# Patient Record
Sex: MALE | Race: OTHER
[De-identification: names, ages, dates, MRNs, and addresses within clinical notes are randomized per-mention and may not be internally consistent; named-entity substitution may affect disease eponyms.]

---

## 2017-03-02 ENCOUNTER — HOSPITAL ENCOUNTER (OUTPATIENT)
Dept: HOSPITAL 61 - ECHO | Age: 72
Discharge: HOME | End: 2017-03-02
Attending: INTERNAL MEDICINE
Payer: MEDICARE

## 2017-03-02 DIAGNOSIS — J44.9: ICD-10-CM

## 2017-03-02 DIAGNOSIS — I50.9: Primary | ICD-10-CM

## 2017-03-02 DIAGNOSIS — I07.1: ICD-10-CM

## 2017-03-02 PROCEDURE — 93306 TTE W/DOPPLER COMPLETE: CPT

## 2017-03-02 NOTE — CARD
--------------- APPROVED REPORT --------------





EXAM: Two-dimensional and M-mode echocardiogram with Doppler and color Doppler.



Other Information 

Quality : GoodHR: 92bpm

Rhythm : NSR



INDICATION

Congestive Heart Failure 

Heart failure (unspecified), COPD



RISK FACTORS

Hypertension 

Obesity   



2D DIMENSIONS 

RVDd3.4 (2.9-3.5cm)Left Atrium(2D)4.4 (1.6-4.0cm)

IVSd1.2 (0.7-1.1cm)Aortic Root(2D)2.8 (2.0-3.7cm)

LVDd5.3 (3.9-5.9cm)LVOT Diameter2.5 (1.8-2.4cm)

PWd1.2 (0.7-1.1cm)LA Qwbpic32 (18-58mL)

LVDs3.7 (2.5-4.0cm)FS (%) 31.0 %

SV78.9 mlLVEF(%)58.3 (>50%)



Aortic Valve

AoV Peak El.167.9cm/sAoV VTI35.0cm

AO Peak GR.11.3mmHgLVOT Peak El.114.2cm/s

LVOT  VTI 24.34cmAO Mean GR.7mmHg

JOSE (VMAX)2.67vu9XEG   (VTI)3.31cm2



Mitral Valve

MV E Cgidervt70.1cm/sMV DECEL [id number]

MV A Xzmkgnpz298.0cm/sMV E Mean Gr.2mmHg

MV ZJO09xtQ/A  Ratio0.8

MV A Rvtmvgek898krLSO (PHT)4.09cm2



TDI

E/Lateral E'12.4E/Medial E'13.1



Pulmonary Valve

PV Peak Elxzygau200.6cm/sPV Peak Grad.5mmHg



Tricuspid Valve

TR P. Icgwqroj296dq/sRAP QAHBSHJF7xqQi

TR Peak Gr.00bgJeAXJU38wdHr



Pulmonary Vein

S1 Wxosoqbl63.3cm/sD2 Mukpyiou21.8cm/s

PVa mikqrvfu428wjjf



 LEFT VENTRICLE 

The left ventricle is normal size. There is mild concentric left ventricular hypertrophy. The left ve
ntricular systolic function is normal. The Ejection Fraction is 60-65%. There is normal LV segmental 
wall motion. Transmitral Doppler flow pattern is Grade I-abnormal relaxation pattern.



 RIGHT VENTRICLE 

The right ventricle is normal size. There is normal right ventricular wall thickness. The right ventr
icular systolic function is normal.



 ATRIA 

The left atrium is mildly dilated. The right atrium size is normal. The interatrial septum is intact 
with no evidence for an atrial septal defect or patent foramen ovale as noted on 2-D or Doppler imagi
ng.



 AORTIC VALVE 

The aortic valve is mildly sclerotic. The aortic valve is trileaflet. Doppler and Color Flow revealed
 no significant aortic regurgitation. There is no significant aortic valvular stenosis.



 MITRAL VALVE 

The mitral valve leaflets are thickened. There is no evidence of mitral valve prolapse. There is no m
itral valve stenosis. Doppler and Color Flow revealed no mitral valve regurgitation noted.



 TRICUSPID VALVE 

Doppler and Color Flow revealed trace tricuspid regurgitation. The pulmonary artery systolic pressure
 is estimated at 40 mmHg. There is mild pulmonary hypertension.



 PULMONIC VALVE 

Doppler and Color Flow revealed trace pulmonic valvular regurgitation. There is no pulmonic valvular 
stenosis.



 GREAT VESSELS 

The aortic root is normal in size. The ascending aorta is normal in size. The pulmonary artery is nor
mal. The IVC is normal in size and collapses >50% with inspiration.



 PERICARDIAL EFFUSION 

There is no evidence of significant pericardial effusion.



Critical Notification

Critical Value: No



<Conclusion>

The left ventricular systolic function is normal.

The Ejection Fraction is 60-65%.

There is normal LV segmental wall motion.

Transmitral Doppler flow pattern is Grade I-abnormal relaxation pattern.

Doppler and Color Flow revealed trace tricuspid regurgitation. 

The pulmonary artery systolic pressure is estimated at 40 mmHg. 

There is no evidence of significant pericardial effusion.

## 2018-02-27 ENCOUNTER — HOSPITAL ENCOUNTER (OUTPATIENT)
Dept: HOSPITAL 61 - ECHO | Age: 73
Discharge: HOME | End: 2018-02-27
Attending: NURSE PRACTITIONER
Payer: MEDICARE

## 2018-02-27 DIAGNOSIS — I31.3: ICD-10-CM

## 2018-02-27 DIAGNOSIS — I11.0: Primary | ICD-10-CM

## 2018-02-27 DIAGNOSIS — I36.1: ICD-10-CM

## 2018-02-27 DIAGNOSIS — E66.9: ICD-10-CM

## 2018-02-27 DIAGNOSIS — I50.9: ICD-10-CM

## 2018-02-27 PROCEDURE — 93306 TTE W/DOPPLER COMPLETE: CPT

## 2018-03-01 ENCOUNTER — HOSPITAL ENCOUNTER (OUTPATIENT)
Dept: HOSPITAL 61 - US | Age: 73
Discharge: HOME | End: 2018-03-01
Attending: NURSE PRACTITIONER
Payer: MEDICARE

## 2018-03-01 DIAGNOSIS — E66.01: Primary | ICD-10-CM

## 2018-03-01 DIAGNOSIS — K76.0: ICD-10-CM

## 2018-03-01 PROCEDURE — 76705 ECHO EXAM OF ABDOMEN: CPT

## 2018-09-24 ENCOUNTER — HOSPITAL ENCOUNTER (OUTPATIENT)
Dept: HOSPITAL 61 - ECHO | Age: 73
Discharge: HOME | End: 2018-09-24
Attending: INTERNAL MEDICINE
Payer: MEDICARE

## 2018-09-24 DIAGNOSIS — I50.9: ICD-10-CM

## 2018-09-24 DIAGNOSIS — J44.9: ICD-10-CM

## 2018-09-24 DIAGNOSIS — I11.0: ICD-10-CM

## 2018-09-24 DIAGNOSIS — I36.1: Primary | ICD-10-CM

## 2018-09-24 DIAGNOSIS — E66.01: ICD-10-CM

## 2018-09-24 PROCEDURE — 93306 TTE W/DOPPLER COMPLETE: CPT

## 2018-09-24 NOTE — CARD
MR#: [id number]

Account#: [id number]

Accession#: [id number]

Date of Study: 09/24/2018

Ordering Physician: DANTE BENNETT, 

Referring Physician: DANTE BENNETT, 

Tech: Jaki Faye RDCS





--------------- APPROVED REPORT --------------





EXAM: Two-dimensional and M-mode echocardiogram with Doppler and color Doppler.



Other Information 

Quality : Good



INDICATION

Exertional Dyspnea



2D DIMENSIONS 

RVDd3.2 (2.9-3.5cm)Left Atrium(2D)4.2 (1.6-4.0cm)

IVSd1.0 (0.7-1.1cm)Aortic Root(2D)2.9 (2.0-3.7cm)

LVDd4.7 (3.9-5.9cm)LVOT Diameter2.2 (1.8-2.4cm)

PWd1.0 (0.7-1.1cm)LVDs3.6 (2.5-4.0cm)

FS (%) 23.3 %SV48.2 ml

LVEF(%)60.0 (>50%)



Aortic Valve

AoV Peak El.155.2cm/sAoV VTI31.8cm

AO Peak GR.9.6mmHgLVOT Peak El.121.5cm/s

LVOT  VTI 29.16cmAO Mean GR.5mmHg

JOSE (VMAX)3.44ie4SBH   (VTI)3.51cm2



Mitral Valve

MV E Hlxmtmlc78.3cm/sMV DECEL PQJJ267cs

MV A Kcitrsdo640.4cm/sMV ALG36ey

E/A  Ratio0.6MVA (PHT)2.98cm2



TDI

E/Lateral E'10.7E/Medial E'14.5



Tricuspid Valve

TR P. Bcukdlus021og/sRAP VGEKAILU34weZs

TR Peak Gr.82trKxBZGI23xvRy



Pulmonary Vein

S1 Xqvpkpyt82.1cm/sD2 Hxihbjcg47.0cm/s



 LEFT VENTRICLE 

The left ventricle is normal size. There is normal left ventricular wall thickness. The left ventricu
lar systolic function is normal and the ejection fraction is within normal range. The Ejection Fracti
on is 60-65%. There is normal LV segmental wall motion. Transmitral Doppler flow pattern is Grade I-a
bnormal relaxation pattern.



 RIGHT VENTRICLE 

The right ventricle is mildly dilated. The right ventricular systolic function is normal.



 ATRIA 

The left atrium is mildly dilated. The right atrium is mildly dilated. The interatrial septum is inta
ct with no evidence for an atrial septal defect or patent foramen ovale as noted on 2-D or Doppler im
aging.



 AORTIC VALVE 

The aortic valve is calcified but opens well. Doppler and Color Flow revealed no significant aortic r
egurgitation. There is no significant aortic valvular stenosis.



 MITRAL VALVE 

The mitral valve is calcified but opens well. There is no evidence of mitral valve prolapse. There is
 no mitral valve stenosis. Doppler and Color Flow revealed trace mitral valve regurgitation.



 TRICUSPID VALVE 

The tricuspid valve is normal in structure and function. Doppler and Color Flow revealed mild tricusp
id regurgitation. The PA pressure was estimated at 54 mmHg. There is no tricuspid valve stenosis.



 PULMONIC VALVE 

The pulmonic valve is not well visualized. Doppler and Color Flow revealed mild pulmonic valvular reg
urgitation. There is no pulmonic valvular stenosis.



 GREAT VESSELS 

The aortic root is normal in size. The ascending aorta is mildly dilated. The IVC is dilated and shanti
apses <50% with inspiration.



 PERICARDIAL EFFUSION 

There is no evidence of significant pericardial effusion.



Critical Notification

Critical Value: No



<Conclusion>

The left ventricle is normal size.

The left ventricular systolic function is normal and the ejection fraction is within normal range.

The Ejection Fraction is 60-65%.

There is no significant aortic valvular stenosis.

Doppler and Color Flow revealed no significant aortic regurgitation.

Doppler and Color Flow revealed trace mitral valve regurgitation.

Doppler and Color Flow revealed mild tricuspid regurgitation.

The PA pressure was estimated at 54 mmHg.

The ascending aorta is mildly dilated.



Signed by : Dante Bennett MD

Electronically Approved : 09/24/2018 12:03:43

## 2018-10-08 ENCOUNTER — HOSPITAL ENCOUNTER (OUTPATIENT)
Dept: HOSPITAL 61 - LAB | Age: 73
Discharge: HOME | End: 2018-10-08
Attending: INTERNAL MEDICINE
Payer: MEDICARE

## 2018-10-08 DIAGNOSIS — R06.00: Primary | ICD-10-CM

## 2018-10-08 PROCEDURE — 83880 ASSAY OF NATRIURETIC PEPTIDE: CPT

## 2018-10-08 PROCEDURE — 36415 COLL VENOUS BLD VENIPUNCTURE: CPT

## 2018-10-08 PROCEDURE — 71046 X-RAY EXAM CHEST 2 VIEWS: CPT

## 2018-10-08 NOTE — RAD
Chest, 2 views, 10/8/2018:

 

HISTORY: Shortness of breath

 

Comparison is made to a study from 1/4/2017. The heart size is normal. 

There is calcific plaquing of the aorta. Bibasilar streaky opacities 

appear to have worsened slightly since the previous study. The appearance 

suggests a combination of atelectasis and scarring. The upper lung fields 

are clear. No pleural fluid is evident. Scattered degenerative changes are

present in the spine. Incidental note is made of a lap band type device 

related to the proximal aspect of the stomach.

 

IMPRESSION: Moderate bibasilar linear atelectasis and/or scarring.

 

Electronically signed by: Rick Moritz, MD (10/8/2018 3:51 PM) [facility]

## 2018-11-21 ENCOUNTER — HOSPITAL ENCOUNTER (OUTPATIENT)
Dept: HOSPITAL 61 - CT | Age: 73
Discharge: HOME | End: 2018-11-21
Attending: INTERNAL MEDICINE
Payer: MEDICARE

## 2018-11-21 DIAGNOSIS — I25.10: Primary | ICD-10-CM

## 2018-11-21 PROCEDURE — 71250 CT THORAX DX C-: CPT

## 2018-11-21 NOTE — RAD
CT of the chest without contrast, 11/21/2018:

 

HISTORY: Abnormal chest x-ray, short of breath

 

Multidetector CT imaging was performed without contrast as requested.

 

There is mild calcific plaquing of the thoracic aorta without evidence of 

aneurysm. Moderate scattered coronary artery calcifications are present. 

The heart is at the upper limits of normal in size. There are calcified 

mediastinal and right hilar lymph nodes compatible with old granulomatous 

disease. No mediastinal adenopathy is seen. A LAP-BAND type device is 

evident near the GE junction level.

 

There are moderate streaky parenchymal opacities primarily in the lung 

bases. The appearance suggests atelectasis and/or scarring. No pulmonary 

mass is seen. There are prominent subpleural fat accumulations in the 

lower chest as well as abundant mediastinal fat. No pleural fluid is 

evident.

 

There is mild bilateral renal scarring with mild streaky bilateral 

perinephric scarring or edema.

 

Moderate multilevel degenerative changes are present in the spine.

 

 

IMPRESSION:

1. Moderate bibasilar linear atelectasis and/or scarring.

2. Moderate coronary artery calcifications.

 

 

 

PQRS Compliance Statement:

 

One or more of the following individualized dose reduction techniques were

utilized for this examination:  

1. Automated exposure control  

2. Adjustment of the mA and/or kV according to patient size  

3. Use of iterative reconstruction technique

 

 

Electronically signed by: Rick Moritz, MD (11/21/2018 4:42 PM) [facility]

## 2018-12-20 ENCOUNTER — HOSPITAL ENCOUNTER (OUTPATIENT)
Dept: HOSPITAL 61 - SLPLAB | Age: 73
Discharge: HOME | End: 2018-12-20
Attending: INTERNAL MEDICINE
Payer: MEDICARE

## 2018-12-20 DIAGNOSIS — G47.33: Primary | ICD-10-CM

## 2018-12-20 PROCEDURE — 95810 POLYSOM 6/> YRS 4/> PARAM: CPT

## 2018-12-26 NOTE — SLEEP
DATE OF STUDY:  12/20/2018



OBJECTIVE:  The patient is a 73-year-old male with previous study showing

obstructive sleep apnea.  The patient was intolerant of CPAP in the past.  He

has excessive somnolence and observed apnea.



Weight 280 pounds, height 5 feet 6 inches, body mass index 46, [place] sleep

score 17.



INTERPRETATION:  Sleep architecture is characterized by a sleep efficiency of

83% across the 7.5 hours of recording time.  Sleep onset latency is 0.5 minutes.

 Stage volumes are appropriate for age.  Respiratory monitoring shows a total of

244 events for an overall apnea-hypopnea index of 39.5 events per hour of sleep.

 The minimum oxygen saturation is 62%.  The patient is started on treatment. 

Respiratory events remain poorly controlled even on a setting of BiPAP, 30/25. 

On this setting, apnea-hypopnea index is 36.9 events per hour of sleep.  The

vast majority of the events are obstructive in nature.  No significant periodic

limb movements of sleep are observed, index is 21 events per hour, 3 events per

hour associated with arousal.  Tachycardia is observed during the study.



IMPRESSION:  Abnormal polysomnogram showing severe obstructive sleep apnea and

hypopnea, only partially treated using a BiPAP setting of 30/25, oxygen at 2

liters per minute, Respironics medium DreamWear full face mask.



RECOMMENDATIONS:  This is a difficult case as the apnea is untreated despite

maximum BiPAP pressures.  One could consider starting the patient on the above

settings.  He could benefit from a stimulant therapy.  Referral for surgical

treatment of apnea is another possibility.  Of course, the patient should avoid

sedatives and alcohol, avoid the supine position, and pursue weight loss.



Thank you for letting us help with the patient's care.

 



______________________________

DIMITRI ENRIQUEZ MD



DR:  BRIAN/karyna  JOB#:  [id number] / [id number]

DD:  12/26/2018 14:00  DT:  12/26/2018 16:06



ELIZABETH White MD, GEORGE MD

## 2019-12-30 ENCOUNTER — HOSPITAL ENCOUNTER (OUTPATIENT)
Dept: HOSPITAL 61 - RAD | Age: 74
Discharge: HOME | End: 2019-12-30
Attending: FAMILY MEDICINE
Payer: MEDICARE

## 2019-12-30 DIAGNOSIS — I50.9: Primary | ICD-10-CM

## 2019-12-30 PROCEDURE — 71046 X-RAY EXAM CHEST 2 VIEWS: CPT

## 2019-12-30 NOTE — RAD
EXAM: Chest, 2 views.

 

HISTORY: Congestive heart failure.

 

COMPARISON: 12/18/2018

 

FINDINGS: 2 views of chest are obtained. There is mild suspected poor 

congestion. There may be superimposed bilateral infrahilar atelectasis. 

There is no consolidation, pleural effusion or pneumothorax. There is a 

stable prominent cardiac silhouette.

 

IMPRESSION: Stable suspected trace congestion with superimposed infrahilar

atelectasis.

 

Electronically signed by: Radha Oconnor MD (12/30/2019 4:54 PM) [place] [id number]

## 2021-04-29 ENCOUNTER — HOSPITAL ENCOUNTER (OUTPATIENT)
Dept: HOSPITAL 61 - KCIC | Age: 76
End: 2021-04-29
Attending: FAMILY MEDICINE
Payer: MEDICARE

## 2021-04-29 DIAGNOSIS — M19.012: ICD-10-CM

## 2021-04-29 DIAGNOSIS — S42.012A: Primary | ICD-10-CM
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
## 2021-04-29 PROCEDURE — 73030 X-RAY EXAM OF SHOULDER: CPT

## 2021-04-29 PROCEDURE — 73000 X-RAY EXAM OF COLLAR BONE: CPT

## 2021-04-29 NOTE — KCIC
XR LEFT CLAVICLE, XR SHOULDER_LEFT 2+ VIEWS



History: Acute strain of left shoulder position recliner. Left shoulder pain.



Comparison: Chest x-ray 12/30/2019



Technique: 2 views of the left clavicle 3 views the left shoulder.



Findings:

There is an acute transverse fracture of the medial left clavicle with approximately 9 mm inferior di
splacement of the distal clavicle. There is ill-defined lobulated lucency at the fracture margins, no
t appreciated on comparison exam.

Acromioclavicular joint and glenohumeral joints are normally aligned.

Mild degenerative changes of left shoulder.



Impression: 

1.  Acute transverse fracture medial left clavicle. Irregular lucent margins of the fracture. Questio
n underlying lytic clavicle lesion causing pathologic fracture.

2.  Mild degenerative changes of the left shoulder.



Electronically signed by: Brendon Bloom MD (4/29/2021 1:16 PM) [facility]

## 2021-04-29 NOTE — KCIC
XR LEFT CLAVICLE, XR SHOULDER_LEFT 2+ VIEWS



History: Acute strain of left shoulder position recliner. Left shoulder pain.



Comparison: Chest x-ray 12/30/2019



Technique: 2 views of the left clavicle 3 views the left shoulder.



Findings:

There is an acute transverse fracture of the medial left clavicle with approximately 9 mm inferior di
splacement of the distal clavicle. There is ill-defined lobulated lucency at the fracture margins, no
t appreciated on comparison exam.

Acromioclavicular joint and glenohumeral joints are normally aligned.

Mild degenerative changes of left shoulder.



Impression: 

1.  Acute transverse fracture medial left clavicle. Irregular lucent margins of the fracture. Questio
n underlying lytic clavicle lesion causing pathologic fracture.

2.  Mild degenerative changes of the left shoulder.



Electronically signed by: Brendon Bloom MD (4/29/2021 1:16 PM) [facility]

## 2021-05-04 ENCOUNTER — HOSPITAL ENCOUNTER (OUTPATIENT)
Dept: HOSPITAL 61 - KCIC US | Age: 76
End: 2021-05-04
Attending: FAMILY MEDICINE
Payer: MEDICARE

## 2021-05-04 DIAGNOSIS — L97.909: Primary | ICD-10-CM

## 2021-05-04 DIAGNOSIS — I70.203: ICD-10-CM

## 2021-05-04 DIAGNOSIS — M25.512: ICD-10-CM

## 2021-05-04 PROCEDURE — 93926 LOWER EXTREMITY STUDY: CPT

## 2021-05-05 NOTE — KCIC
LEFT LOWER EXTREMITY DUPLEX ARTERY ULTRASOUND 



Indication: Reason: PVD; PRESSURE ULCER / Spl. Instructions:  / History:  



Comparison: None.



Procedure: Real-time grayscale, color flow Doppler, and Doppler spectral waveform analysis of the art
erial system of the lower extremity is performed.



Findings: 

Triphasic waveforms of the common femoral, profunda, and proximal superficial femoral arteries. There
 are biphasic waveforms in the remainder of the left lower extremity. There is no arterial occlusion.
 No focal elevated peak systolic velocity is identified. Atherosclerotic disease of the popliteal art
ramiro and distal superficial femoral artery and calf arteries is noted.



IMPRESSION:

No hemodynamically significant stenosis.



Electronically signed by: Hadley Angulo MD (5/5/2021 11:06 AM) [id number]